# Patient Record
Sex: FEMALE | Race: WHITE | NOT HISPANIC OR LATINO
[De-identification: names, ages, dates, MRNs, and addresses within clinical notes are randomized per-mention and may not be internally consistent; named-entity substitution may affect disease eponyms.]

---

## 2017-01-25 ENCOUNTER — RESULT REVIEW (OUTPATIENT)
Age: 37
End: 2017-01-25

## 2017-01-26 ENCOUNTER — APPOINTMENT (OUTPATIENT)
Dept: OBGYN | Facility: CLINIC | Age: 37
End: 2017-01-26

## 2017-10-19 ENCOUNTER — APPOINTMENT (OUTPATIENT)
Dept: INTERNAL MEDICINE | Facility: CLINIC | Age: 37
End: 2017-10-19
Payer: COMMERCIAL

## 2017-10-19 DIAGNOSIS — Z00.00 ENCOUNTER FOR GENERAL ADULT MEDICAL EXAMINATION W/OUT ABNORMAL FINDINGS: ICD-10-CM

## 2017-10-19 PROCEDURE — G0008: CPT

## 2017-10-19 PROCEDURE — 90686 IIV4 VACC NO PRSV 0.5 ML IM: CPT

## 2018-04-15 ENCOUNTER — RESULT REVIEW (OUTPATIENT)
Age: 38
End: 2018-04-15

## 2018-04-16 ENCOUNTER — APPOINTMENT (OUTPATIENT)
Dept: OBGYN | Facility: CLINIC | Age: 38
End: 2018-04-16
Payer: COMMERCIAL

## 2018-04-16 PROCEDURE — 99395 PREV VISIT EST AGE 18-39: CPT

## 2018-10-26 ENCOUNTER — APPOINTMENT (OUTPATIENT)
Dept: INTERNAL MEDICINE | Facility: CLINIC | Age: 38
End: 2018-10-26
Payer: COMMERCIAL

## 2018-10-26 PROCEDURE — 90686 IIV4 VACC NO PRSV 0.5 ML IM: CPT

## 2018-10-26 PROCEDURE — G0008: CPT

## 2018-12-29 DIAGNOSIS — R19.7 DIARRHEA, UNSPECIFIED: ICD-10-CM

## 2019-08-22 ENCOUNTER — APPOINTMENT (OUTPATIENT)
Dept: OBGYN | Facility: CLINIC | Age: 39
End: 2019-08-22
Payer: COMMERCIAL

## 2019-08-22 ENCOUNTER — RESULT REVIEW (OUTPATIENT)
Age: 39
End: 2019-08-22

## 2019-08-22 PROCEDURE — 99395 PREV VISIT EST AGE 18-39: CPT

## 2019-10-25 ENCOUNTER — APPOINTMENT (OUTPATIENT)
Dept: INTERNAL MEDICINE | Facility: CLINIC | Age: 39
End: 2019-10-25
Payer: COMMERCIAL

## 2019-10-25 PROCEDURE — G0008: CPT

## 2019-10-25 PROCEDURE — 90674 CCIIV4 VAC NO PRSV 0.5 ML IM: CPT

## 2020-07-08 ENCOUNTER — TRANSCRIPTION ENCOUNTER (OUTPATIENT)
Age: 40
End: 2020-07-08

## 2020-10-05 ENCOUNTER — FORM ENCOUNTER (OUTPATIENT)
Age: 40
End: 2020-10-05

## 2020-10-12 ENCOUNTER — APPOINTMENT (OUTPATIENT)
Dept: INTERNAL MEDICINE | Facility: CLINIC | Age: 40
End: 2020-10-12
Payer: COMMERCIAL

## 2020-10-12 PROCEDURE — G0008: CPT

## 2020-10-12 PROCEDURE — 90686 IIV4 VACC NO PRSV 0.5 ML IM: CPT

## 2020-12-09 ENCOUNTER — FORM ENCOUNTER (OUTPATIENT)
Age: 40
End: 2020-12-09

## 2020-12-10 ENCOUNTER — APPOINTMENT (OUTPATIENT)
Dept: OBGYN | Facility: CLINIC | Age: 40
End: 2020-12-10
Payer: COMMERCIAL

## 2020-12-10 ENCOUNTER — RESULT REVIEW (OUTPATIENT)
Age: 40
End: 2020-12-10

## 2020-12-10 VITALS
SYSTOLIC BLOOD PRESSURE: 114 MMHG | DIASTOLIC BLOOD PRESSURE: 60 MMHG | WEIGHT: 142 LBS | BODY MASS INDEX: 22.82 KG/M2 | HEIGHT: 66 IN

## 2020-12-10 PROCEDURE — 99396 PREV VISIT EST AGE 40-64: CPT

## 2020-12-10 PROCEDURE — 99072 ADDL SUPL MATRL&STAF TM PHE: CPT

## 2020-12-28 ENCOUNTER — FORM ENCOUNTER (OUTPATIENT)
Age: 40
End: 2020-12-28

## 2021-04-12 ENCOUNTER — FORM ENCOUNTER (OUTPATIENT)
Age: 41
End: 2021-04-12

## 2021-12-23 DIAGNOSIS — Z78.9 OTHER SPECIFIED HEALTH STATUS: ICD-10-CM

## 2021-12-23 DIAGNOSIS — Z92.89 PERSONAL HISTORY OF OTHER MEDICAL TREATMENT: ICD-10-CM

## 2021-12-23 DIAGNOSIS — Z98.890 OTHER SPECIFIED POSTPROCEDURAL STATES: ICD-10-CM

## 2022-01-24 ENCOUNTER — APPOINTMENT (OUTPATIENT)
Dept: OBGYN | Facility: CLINIC | Age: 42
End: 2022-01-24
Payer: COMMERCIAL

## 2022-01-24 VITALS
HEIGHT: 66 IN | BODY MASS INDEX: 23.78 KG/M2 | DIASTOLIC BLOOD PRESSURE: 82 MMHG | WEIGHT: 148 LBS | SYSTOLIC BLOOD PRESSURE: 110 MMHG

## 2022-01-24 DIAGNOSIS — O09.529 SUPERVISION OF ELDERLY MULTIGRAVIDA, UNSPECIFIED TRIMESTER: ICD-10-CM

## 2022-01-24 DIAGNOSIS — Z12.31 ENCOUNTER FOR SCREENING MAMMOGRAM FOR MALIGNANT NEOPLASM OF BREAST: ICD-10-CM

## 2022-01-24 PROCEDURE — 99396 PREV VISIT EST AGE 40-64: CPT

## 2022-01-24 NOTE — HISTORY OF PRESENT ILLNESS
[N] : Patient reports normal menses [TextBox_4] : 40yo  presents for routine gyn exam. No complaints.  On OCP - would like to skip period next month r/t travel.\par \par \par Info. from prior EMR\par Demographics:  Race: White Ethnicity: Not  or  Native Language: English \par : 2  Para:  2 0 0 3\par OB History: No significant OB history. \par Year:  Mode Deliv:  Sex: M WT: 5 LBS 10 OZ Sex: M WT: 5LBS 12 OZ Remarks: TWINS (IVF) \par Year:  Mode Deliv:  \par \par GYN History: No significant GYN history. Sexually Active  \par \par Type of Contraception: OCP'S \par PMH\par  No significant past medical history. \par Surgical History:  x2 \par Allergies: PCN, \par Current Medications: Prescribed/Suppliments/OTC junel ocp \par Medications Verified Medications Verified \par Last PAP: 2019 -- pap neg hpv neg portal message sent [id number] Last Mammo: 2016 - mammogram negative Last OB Sono: 2015 - OB SONO REVIEWED \par Family Hx\par  No significant family history  \par  Personal history of blood clots/DVT/PE: no  \par  Personal history of conditions causing increased risk of blood clots/DVT/PE: no  \par  Family history of blood clots/DVT/PE: no  \par  Family history of conditions causing increased risk of blood clots/DVT/PE: no [Mammogramdate] : 4/2021 [TextBox_19] : wnl [PapSmeardate] : 12/2020 [TextBox_31] : wnl [MensesFreq] : 28 [MensesLength] : 5 [MensesAmount] : normal

## 2022-01-24 NOTE — PLAN
[FreeTextEntry1] : HCM\par -SBE\par -pap & HPV\par -Rx mammo\par -refilled OCPs\par -MVI, Calcium, Vit d\par -Weight/exercise\par -disucssed skipping placebos to change cycle and likelihood of spotting.\par RTO 1 year\par

## 2022-01-29 LAB
CYTOLOGY CVX/VAG DOC THIN PREP: NORMAL
HPV HIGH+LOW RISK DNA PNL CVX: NOT DETECTED

## 2022-11-23 ENCOUNTER — RX RENEWAL (OUTPATIENT)
Age: 42
End: 2022-11-23

## 2022-11-23 DIAGNOSIS — Z30.9 ENCOUNTER FOR CONTRACEPTIVE MANAGEMENT, UNSPECIFIED: ICD-10-CM

## 2023-03-16 ENCOUNTER — RX RENEWAL (OUTPATIENT)
Age: 43
End: 2023-03-16

## 2023-04-03 ENCOUNTER — NON-APPOINTMENT (OUTPATIENT)
Age: 43
End: 2023-04-03

## 2023-06-12 ENCOUNTER — RX RENEWAL (OUTPATIENT)
Age: 43
End: 2023-06-12

## 2023-06-13 RX ORDER — NORETHINDRONE ACETATE AND ETHINYL ESTRADIOL AND FERROUS FUMARATE 1.5-30(21)
1.5-3 KIT ORAL DAILY
Qty: 28 | Refills: 0 | Status: ACTIVE | COMMUNITY
Start: 2021-12-30 | End: 1900-01-01

## 2023-07-06 ENCOUNTER — APPOINTMENT (OUTPATIENT)
Dept: OBGYN | Facility: CLINIC | Age: 43
End: 2023-07-06
Payer: COMMERCIAL

## 2023-07-06 VITALS
WEIGHT: 148 LBS | HEIGHT: 66 IN | SYSTOLIC BLOOD PRESSURE: 133 MMHG | HEART RATE: 86 BPM | DIASTOLIC BLOOD PRESSURE: 88 MMHG | BODY MASS INDEX: 23.78 KG/M2

## 2023-07-06 DIAGNOSIS — Z30.09 ENCOUNTER FOR OTHER GENERAL COUNSELING AND ADVICE ON CONTRACEPTION: ICD-10-CM

## 2023-07-06 DIAGNOSIS — Z01.419 ENCOUNTER FOR GYNECOLOGICAL EXAMINATION (GENERAL) (ROUTINE) W/OUT ABNORMAL FINDINGS: ICD-10-CM

## 2023-07-06 PROCEDURE — 99396 PREV VISIT EST AGE 40-64: CPT

## 2023-07-06 RX ORDER — NORETHINDRONE ACETATE AND ETHINYL ESTRADIOL AND FERROUS FUMARATE 1.5-30(21)
1.5-3 KIT ORAL
Qty: 84 | Refills: 3 | Status: ACTIVE | COMMUNITY
Start: 2023-07-06 | End: 1900-01-01

## 2023-07-06 NOTE — HISTORY OF PRESENT ILLNESS
[FreeTextEntry1] : 41yo  presents for routine gyn exam.  Pt. was using Junel 1. but was given Pauline instead and did have a longer period this month. Declines sti screen. No other gyn complaints.  Pt. reports her mother was diagnosed with breast cancer this year.  Pt. did not go for mammo last year. \par \par \par <<<<<Last exam 2022 AV with [facility]\par 40yo  presents for routine gyn exam. No complaints. On OCP - would like to skip period next month r/t travel.\par \par \par Info. from prior EMR\par Demographics: Race: White Ethnicity: Not  or  Native Language: English \par : 2 Para: 2 0 0 3\par OB History: No significant OB history. \par Year:  Mode Deliv:  Sex: M WT: 5 LBS 10 OZ Sex: M WT: 5LBS 12 OZ Remarks: TWINS (IVF) \par Year:  Mode Deliv:  \par \par GYN History: No significant GYN history. Sexually Active  \par \par Type of Contraception: OCP'S \par PMH\par  No significant past medical history. \par Surgical History:  x2 \par Allergies: PCN, \par Current Medications: Prescribed/Suppliments/OTC junel ocp \par Medications Verified Medications Verified \par Last PAP: 2019 -- pap neg hpv neg portal message sent [id number] Last Mammo: 2016 - mammogram negative Last OB Sono: 2015 - OB SONO REVIEWED \par Family Hx\par  No significant family history \par  Personal history of blood clots/DVT/PE: no \par  Personal history of conditions causing increased risk of blood clots/DVT/PE: no \par  Family history of blood clots/DVT/PE: no \par  Family history of conditions causing increased risk of blood clots/DVT/PE: no \par Mammogram: 2021, wnl. \par PAP Smear: 2020, wnl. \par Menstrual cycle frequency (days): 28 \par Cycle length (days): 5 \par Menses amount: normal \par Menstrual Problems:. Patient reports normal menses.  [Mammogramdate] : 4/2021 [TextBox_19] : wnl [PapSmeardate] : 1/2022 [TextBox_31] : wnl

## 2023-07-06 NOTE — PLAN
[FreeTextEntry1] : HCM\par -SBE\par -pap & HPV today\par -Rx mammo/sono given\par -MVI\par -Weight/exercise\par -refilled Pauline 1.5/30 fe\par \par RTO 1 year\par

## 2023-07-08 LAB — HPV HIGH+LOW RISK DNA PNL CVX: NOT DETECTED

## 2023-07-13 ENCOUNTER — TRANSCRIPTION ENCOUNTER (OUTPATIENT)
Age: 43
End: 2023-07-13

## 2023-07-13 LAB — CYTOLOGY CVX/VAG DOC THIN PREP: ABNORMAL

## 2024-07-05 ENCOUNTER — RX RENEWAL (OUTPATIENT)
Age: 44
End: 2024-07-05

## 2024-08-01 DIAGNOSIS — Z12.31 ENCOUNTER FOR SCREENING MAMMOGRAM FOR MALIGNANT NEOPLASM OF BREAST: ICD-10-CM

## 2024-08-01 DIAGNOSIS — Z80.3 FAMILY HISTORY OF MALIGNANT NEOPLASM OF BREAST: ICD-10-CM

## 2024-09-16 ENCOUNTER — APPOINTMENT (OUTPATIENT)
Dept: MAMMOGRAPHY | Facility: CLINIC | Age: 44
End: 2024-09-16
Payer: COMMERCIAL

## 2024-09-16 ENCOUNTER — RESULT REVIEW (OUTPATIENT)
Age: 44
End: 2024-09-16

## 2024-09-16 ENCOUNTER — APPOINTMENT (OUTPATIENT)
Dept: ULTRASOUND IMAGING | Facility: CLINIC | Age: 44
End: 2024-09-16
Payer: COMMERCIAL

## 2024-09-16 PROCEDURE — 77063 BREAST TOMOSYNTHESIS BI: CPT

## 2024-09-16 PROCEDURE — 76641 ULTRASOUND BREAST COMPLETE: CPT | Mod: 50

## 2024-09-16 PROCEDURE — 77067 SCR MAMMO BI INCL CAD: CPT

## 2024-09-25 ENCOUNTER — NON-APPOINTMENT (OUTPATIENT)
Age: 44
End: 2024-09-25

## 2024-09-26 ENCOUNTER — APPOINTMENT (OUTPATIENT)
Dept: OBGYN | Facility: CLINIC | Age: 44
End: 2024-09-26
Payer: COMMERCIAL

## 2024-09-26 VITALS
DIASTOLIC BLOOD PRESSURE: 90 MMHG | HEART RATE: 103 BPM | BODY MASS INDEX: 24.11 KG/M2 | SYSTOLIC BLOOD PRESSURE: 136 MMHG | HEIGHT: 66 IN | WEIGHT: 150 LBS

## 2024-09-26 DIAGNOSIS — N63.0 UNSPECIFIED LUMP IN UNSPECIFIED BREAST: ICD-10-CM

## 2024-09-26 DIAGNOSIS — Z11.51 ENCOUNTER FOR SCREENING FOR HUMAN PAPILLOMAVIRUS (HPV): ICD-10-CM

## 2024-09-26 DIAGNOSIS — Z91.89 OTHER SPECIFIED PERSONAL RISK FACTORS, NOT ELSEWHERE CLASSIFIED: ICD-10-CM

## 2024-09-26 PROCEDURE — 99386 PREV VISIT NEW AGE 40-64: CPT

## 2024-09-26 NOTE — PHYSICAL EXAM
[Chaperone Present] : A chaperone was present in the examining room during all aspects of the physical examination [Appropriately responsive] : appropriately responsive [Alert] : alert [No Acute Distress] : no acute distress [No Lymphadenopathy] : no lymphadenopathy [Soft] : soft [Non-tender] : non-tender [Non-distended] : non-distended [No HSM] : No HSM [No Lesions] : no lesions [No Mass] : no mass [Oriented x3] : oriented x3 [Examination Of The Breasts] : a normal appearance [No Masses] : no breast masses were palpable [Labia Majora] : normal [Labia Minora] : normal [Normal] : normal [Uterine Adnexae] : normal [FreeTextEntry2] : Jr

## 2024-09-26 NOTE — END OF VISIT
[FreeTextEntry3] : I Perez Ragsdale, acted as a scribe on behalf of Dr. Joe Parker on 09/26/2024.  All medical entries made by this scribe where at my Dr. Joe Parker, direction and personally dictated by me on 09/26/2024.  I have reviewed the chart and agree that the record accurately reflects my personal performance of the history, physical exam, assessment, and plan. I have also personally directed, reviewed and agreed with the chart.

## 2024-09-26 NOTE — PLAN
[FreeTextEntry1] : 43 yo for annual visit.   HCM -pap smear today -erx Pauline FE  -mammo/sono up to date, f/u sono in 6 months -rto 1 year  Tyrer-Cuzick score of 34.4% & Mother breast cancer (diagnosed at age 70, 2 years ago) -rx breast MRI -recommended Hereditary cancer screening

## 2024-09-27 LAB — HPV HIGH+LOW RISK DNA PNL CVX: NOT DETECTED

## 2024-10-04 LAB — CYTOLOGY CVX/VAG DOC THIN PREP: NORMAL

## 2024-10-17 ENCOUNTER — APPOINTMENT (OUTPATIENT)
Dept: MRI IMAGING | Facility: CLINIC | Age: 44
End: 2024-10-17
Payer: COMMERCIAL

## 2024-10-17 PROCEDURE — 77049 MRI BREAST C-+ W/CAD BI: CPT

## 2025-03-12 DIAGNOSIS — N64.59 OTHER SIGNS AND SYMPTOMS IN BREAST: ICD-10-CM

## 2025-03-24 ENCOUNTER — RESULT REVIEW (OUTPATIENT)
Age: 45
End: 2025-03-24

## 2025-03-24 ENCOUNTER — APPOINTMENT (OUTPATIENT)
Dept: MRI IMAGING | Facility: CLINIC | Age: 45
End: 2025-03-24
Payer: COMMERCIAL

## 2025-03-24 PROCEDURE — 19086Z: CUSTOM | Mod: LT

## 2025-03-24 PROCEDURE — A9585: CPT

## 2025-03-24 PROCEDURE — 19085Z: CUSTOM | Mod: LT

## 2025-03-24 PROCEDURE — 77065 DX MAMMO INCL CAD UNI: CPT | Mod: LT

## 2025-03-24 PROCEDURE — A4648: CPT
